# Patient Record
(demographics unavailable — no encounter records)

---

## 2024-10-14 NOTE — HISTORY OF PRESENT ILLNESS
[FreeTextEntry1] : Previously seen by Dr. Ángela Méndez in the past.  Workup was negative.  Pain at that time was presumed to be secondary to tamoxifen versus fibromyalgia. More recently, over the last few months, joint pain has gotten worse and is felt in left shoulder, left knee, b/l hands, wrist and elbows.  no joint swelling  left shoulder is stiff stiffness in the joints lasts all day  pain is described as sharp and burning +chronic shortness of breath due to asthma.  s/p spine surgery, L4-5 fusion tried Tylenol arthritis but it does not help  pain is so severe at times; she does not want to get up because it hurts.  1/2023 abs: RF negative, CRP negative, ESR wnl, CPK wnl, uric acid wnl, CCP negative

## 2024-10-14 NOTE — PHYSICAL EXAM
[General Appearance - Alert] : alert [General Appearance - In No Acute Distress] : in no acute distress [Full Pulse] : the pedal pulses are present [Edema] : there was no peripheral edema [FreeTextEntry1] : MCP and PIP TTP 2-5, wrist TTP b/l; decreased ROM left shoulder [Oriented To Time, Place, And Person] : oriented to person, place, and time [Impaired Insight] : insight and judgment were intact [Affect] : the affect was normal

## 2024-10-14 NOTE — ASSESSMENT
[FreeTextEntry1] : US of the hands, wrists, left shoulder and left knee to evaluate for presence of synovitis repeat serology as outlined below  Increase Celebrex to 200 mg bid for better symptom control.  US of the neck to evaluate supraclavicular fossa fullness. rule out LAD given history of breast CA  OV 1 months, sooner PRN

## 2024-11-25 NOTE — PHYSICAL EXAM
[de-identified] : Lumbar Physical Exam  Gait - Normal  Station - Normal  Sagittal balance - Normal  Compensatory mechanism? - None   Reflexes Patellar - normal Gastroc - normal Clonus - No  Hip Exam - Normal  Straight leg raise - none  Pulses - 2+ dp/pt  Range of motion - normal  Sensation  Sensation intact to light touch in L1, L2, L3, L4, L5 and S1 dermatomes bilaterally  Motor 	IP	Quad	HS	TA	Gastroc	EHL Right	5/5	5/5	5/5	5/5	5/5	5/5 Left	5/5	5/5	5/5	5/5	5/5	5/5 [de-identified] : Lumbar radiographs from last visit reviewed No instability on flexion-extension Hardware appears in appropriate position

## 2024-11-25 NOTE — ASSESSMENT
[FreeTextEntry1] : This is a 55-year-old female that is now approximately 7 months out from L4-L5 TLIF.  I had a lengthy discussion with the patient in regards to treatment plan and diagnosis. There are no red flag findings on imaging nor are there any red flag findings on clinical exams. Therefore we will proceed with a course of conservative treatment. This would include physical therapy/home exercise program, Celebrex, Tizanidine, and Tylenol as medically indicated. I have given her a prescription for lidocaine patches. The patient will follow up with me in approximately 4 weeks.  I encouraged the patient to follow-up sooner if there are any new or worsening symptoms.  We will get xrays at her next visit as well

## 2024-11-25 NOTE — HISTORY OF PRESENT ILLNESS
[de-identified] : CHECO MAGAÑA is a 55 year old female presenting to the office for a follow up evaluation s/p 7 months L4-L5 TLIF. DOS 04/23/2024 Patient reports symptomatic improvement from previous visit. Patient reports some right sided low back pain. she reports some stiffness in her back. She reports not being able to stand for more than 30 minutes due to this pain. Patient takes Celebrex and a muscle relaxer for pain relief.  09/25/2024 CHECO MAGAÑA is a 55 year old female presenting to the office for a follow up evaluation s/p 5 months L4-L5 TLIF.  Overall she notes improvement in her symptoms.  She does have some focal right sided low back pain.  She denies any issues in terms of bowel bladder function, she is walking and feels like stopping physical therapy helped her.  She denies any severe radiating pain.  Overall she is pleased with her recovery.  08.19.24 This is a 55-year-old female that is now approximately 4 months out from L4-L5 TLIF.  Overall she has done well.  She states that the sharp pain she had of her right buttock has improved.  She states that stopping physical therapy actually helped her symptoms.  She denies any severe radiating type pain.  Occasionally she does limp at this pain comes and goes.  Overall she is pleased with her recovery to date.

## 2024-11-25 NOTE — PHYSICAL EXAM
[de-identified] : Lumbar Physical Exam  Gait - Normal  Station - Normal  Sagittal balance - Normal  Compensatory mechanism? - None   Reflexes Patellar - normal Gastroc - normal Clonus - No  Hip Exam - Normal  Straight leg raise - none  Pulses - 2+ dp/pt  Range of motion - normal  Sensation  Sensation intact to light touch in L1, L2, L3, L4, L5 and S1 dermatomes bilaterally  Motor 	IP	Quad	HS	TA	Gastroc	EHL Right	5/5	5/5	5/5	5/5	5/5	5/5 Left	5/5	5/5	5/5	5/5	5/5	5/5 [de-identified] : Lumbar radiographs from last visit reviewed No instability on flexion-extension Hardware appears in appropriate position

## 2024-11-25 NOTE — ADDENDUM
[FreeTextEntry1] :  I, Jeanne Mendosa, acted solely as a scribe for Dr. Suman Pineda MD on this date 11/25/2024    All medical record entries made by the Scribe were at my, Dr. Suman Pineda MD., direction and personally dictated by me on 11/25/2024 . I have reviewed the chart and agree that the record accurately reflects my personal performance of the history, physical exam, assessment and plan. I have also personally directed, reviewed, and agreed with the chart.

## 2024-11-25 NOTE — HISTORY OF PRESENT ILLNESS
[de-identified] : CHECO MAGAÑA is a 55 year old female presenting to the office for a follow up evaluation s/p 7 months L4-L5 TLIF. DOS 04/23/2024 Patient reports symptomatic improvement from previous visit. Patient reports some right sided low back pain. she reports some stiffness in her back. She reports not being able to stand for more than 30 minutes due to this pain. Patient takes Celebrex and a muscle relaxer for pain relief.  09/25/2024 CHECO MAGAÑA is a 55 year old female presenting to the office for a follow up evaluation s/p 5 months L4-L5 TLIF.  Overall she notes improvement in her symptoms.  She does have some focal right sided low back pain.  She denies any issues in terms of bowel bladder function, she is walking and feels like stopping physical therapy helped her.  She denies any severe radiating pain.  Overall she is pleased with her recovery.  08.19.24 This is a 55-year-old female that is now approximately 4 months out from L4-L5 TLIF.  Overall she has done well.  She states that the sharp pain she had of her right buttock has improved.  She states that stopping physical therapy actually helped her symptoms.  She denies any severe radiating type pain.  Occasionally she does limp at this pain comes and goes.  Overall she is pleased with her recovery to date.

## 2024-11-27 NOTE — HISTORY OF PRESENT ILLNESS
[FreeTextEntry1] : Prior history  More recently, over the last few months, joint pain has gotten worse and is felt in left shoulder, left knee, b/l hands, wrist and elbows.  no joint swelling  left shoulder is stiff stiffness in the joints lasts all day  pain is described as sharp and burning +chronic shortness of breath due to asthma.  s/p spine surgery, L4-5 fusion tried Tylenol arthritis but it does not help  pain is so severe at times; she does not want to get up because it hurts.  1/2023 abs: RF negative, CRP negative, ESR wnl, CPK wnl, uric acid wnl, CCP negative     Pt presents for a follow up visit today.   Labs reviewed with patient in detail.  ESR wnl  CRP wnl  CMP wnl  RF negative CCP negative ADRIANNE, Sjogren's, RAPHAEL negative  US of the joints: Mild productive bone formation seen along the dorsal aspect of some of the visualized interphalangeal joints of the lesser fingers, which may be related to osteoarthritis. Small nonspecific joint effusion within the right second metacarpophalangeal joint without synovitis. No active synovitis seen within the visualized portions of the fingers. No synovitis or tenosynovitis seen within the visualized portions of the wrists bilaterally.  US neck: No concerning mass or fluid collection seen in the region of concern within supraclavicular fossa bilaterally. If there is further clinical concern for a soft tissue mass/lesion in these regions, consider further evaluation with chest MRI or CT with contrast.  US knee: No definite knee effusion. No evidence of synovitis.  US shoulder: No glenohumeral joint effusion. No active synovitis seen of the glenohumeral joint space. Productive bone formation along the acromioclavicular joint space, which may be related to osteoarthritis. No hyperemia seen.   She finds no improvement with meloxicam. She is taking dilaudid chronically for chronic LBP.  Onc appointment pending.

## 2024-11-27 NOTE — PHYSICAL EXAM
[General Appearance - Alert] : alert [General Appearance - In No Acute Distress] : in no acute distress [Full Pulse] : the pedal pulses are present [Edema] : there was no peripheral edema [Abnormal Walk] : normal gait [Nail Clubbing] : no clubbing  or cyanosis of the fingernails [Motor Tone] : muscle strength and tone were normal [Musculoskeletal - Swelling] : no joint swelling seen [Oriented To Time, Place, And Person] : oriented to person, place, and time [Impaired Insight] : insight and judgment were intact [Affect] : the affect was normal

## 2024-11-27 NOTE — ASSESSMENT
[FreeTextEntry1] : There is no evidence of underlying autoimmune disease at this time.   neck swelling--US without suspicious masses; she has an appointment planned with her oncologist and discuss this with them as well as she may need further imaging for breast CA f/u.   Osteoarthritis (OA) The patient presents with symptoms consistent with osteoarthritis, including joint pain and stiffness. Radiographic imaging and clinical examination support the diagnosis. I educated patient on this condition and management strategies.  Pain management:  -Trial of alternative NSAID,  Nabumetone. Potential side effects discussed including GI related side effects, renal/hepatoxicity.  Physical therapy     - Referred to hand therapy for strengthening exercises and joint protection techniques.  OV 3 months sooner PRN   My collective time spent on today's visit was greater than 30 minutes and included: Preparation for the visit, review of the medical records, review of pertinent diagnostic studies, examination and counseling of the patient on the above diagnosis, treatment plan and prognosis, orders of diagnostic test, medications and or appropriate procedures and documentation in the medical record of today's visit.

## 2025-02-06 NOTE — END OF VISIT
[FreeTextEntry3] : My collective time spent on today's visit was greater than 30 minutes and included: Preparation for the visit, review of the medical records, review of pertinent diagnostic studies, examination and counseling of the patient on the above diagnosis, treatment plan and prognosis, orders of diagnostic test, medications and or appropriate procedures and documentation in the medical record of today's visit. [Time Spent: ___ minutes] : I have spent [unfilled] minutes of time on the encounter which excludes teaching and separately reported services.

## 2025-02-06 NOTE — PHYSICAL EXAM
[Alert] : alert [No Acute Distress] : no acute distress [Sclera] : the sclera and conjunctiva were normal [Hearing Threshold Finger Rub Not [place]] : hearing was normal [Normal Appearance] : the appearance of the neck was normal [No Respiratory Distress] : no respiratory distress [Heart Rate And Rhythm] : heart rate was normal and rhythm regular [Auscultation Breath Sounds / Voice Sounds] : lungs were clear to auscultation bilaterally [Normal S1, S2] : normal S1 and S2 [Bowel Sounds] : normal bowel sounds [No Masses] : no abdominal mass palpated [Abdomen Soft] : soft [Supraclavicular Lymph Nodes Enlarged Bilaterally] : no supraclavicular lymphadenopathy [No CVA Tenderness] : no CVA  tenderness [Normal Color / Pigmentation] : normal skin color and pigmentation [Abnormal Walk] : normal gait [Cranial Nerves Intact] : cranial nerves 2-12 were intact [No Focal Deficits] : no focal deficits [Oriented To Time, Place, And Person] : oriented to person, place, and time [de-identified] : upper abdominal tenderness [de-identified] : depressed

## 2025-02-06 NOTE — HISTORY OF PRESENT ILLNESS
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

## 2025-02-06 NOTE — HISTORY OF PRESENT ILLNESS
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

## 2025-02-06 NOTE — REVIEW OF SYSTEMS
[Shortness Of Breath] : shortness of breath [Abdominal Pain] : abdominal pain [Heartburn] : heartburn [Arthralgias (joint pain)] : arthralgias [Joint Stiffness] : joint stiffness [Negative] : Heme/Lymph [As Noted in HPI] : as noted in HPI [FreeTextEntry9] : inflamed joints

## 2025-02-06 NOTE — PHYSICAL EXAM
[Alert] : alert [No Acute Distress] : no acute distress [Sclera] : the sclera and conjunctiva were normal [Hearing Threshold Finger Rub Not [employer]] : hearing was normal [Normal Appearance] : the appearance of the neck was normal [No Respiratory Distress] : no respiratory distress [Auscultation Breath Sounds / Voice Sounds] : lungs were clear to auscultation bilaterally [Heart Rate And Rhythm] : heart rate was normal and rhythm regular [Normal S1, S2] : normal S1 and S2 [Bowel Sounds] : normal bowel sounds [No Masses] : no abdominal mass palpated [Abdomen Soft] : soft [Supraclavicular Lymph Nodes Enlarged Bilaterally] : no supraclavicular lymphadenopathy [No CVA Tenderness] : no CVA  tenderness [Abnormal Walk] : normal gait [Normal Color / Pigmentation] : normal skin color and pigmentation [Cranial Nerves Intact] : cranial nerves 2-12 were intact [No Focal Deficits] : no focal deficits [Oriented To Time, Place, And Person] : oriented to person, place, and time [de-identified] : upper abdominal tenderness [de-identified] : depressed

## 2025-02-06 NOTE — ASSESSMENT
[FreeTextEntry1] : 1.  Abdominal pain, fever, n/v.  CT A/P in 1/2025 with 2.7 cm fatty replacement of pancreas and ?focal pancreatitis/duodenitis. 2.  Status post cholecystectomy for hyperdynamic gallbladder.  CT A/P, MRI, EUS in 2023 with fatty replacement of pancreas.  May have functional GI disorder, gastritis, gas pain/ bloating, vascular etiology, recurrent pancreatitis. 3.  Family history of colon cancer (mother- 60).  Prior colonoscopy in 2022. 4.  History of breast cancer.  Recs: - ER labs and CT images reviewed. - Check IgG4 level. - Pantoprazole renewed. - Ondansetron PRN nausea. - Will review imaging of pancreas with advanced attending to see if MRI or repeat EUS is warranted.

## 2025-02-06 NOTE — PHYSICAL EXAM
[Alert] : alert [No Acute Distress] : no acute distress [Sclera] : the sclera and conjunctiva were normal [Hearing Threshold Finger Rub Not [place]] : hearing was normal [Normal Appearance] : the appearance of the neck was normal [No Respiratory Distress] : no respiratory distress [Heart Rate And Rhythm] : heart rate was normal and rhythm regular [Auscultation Breath Sounds / Voice Sounds] : lungs were clear to auscultation bilaterally [Normal S1, S2] : normal S1 and S2 [Bowel Sounds] : normal bowel sounds [No Masses] : no abdominal mass palpated [Abdomen Soft] : soft [Supraclavicular Lymph Nodes Enlarged Bilaterally] : no supraclavicular lymphadenopathy [No CVA Tenderness] : no CVA  tenderness [Normal Color / Pigmentation] : normal skin color and pigmentation [Abnormal Walk] : normal gait [Cranial Nerves Intact] : cranial nerves 2-12 were intact [No Focal Deficits] : no focal deficits [Oriented To Time, Place, And Person] : oriented to person, place, and time [de-identified] : upper abdominal tenderness [de-identified] : depressed

## 2025-02-06 NOTE — HISTORY OF PRESENT ILLNESS
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

## 2025-02-19 NOTE — ASSESSMENT
[FreeTextEntry1] : This is a 56-year-old female that is now approximately 10 months out from surgery. There are no red flag findings on imaging nor are there any red flag findings on clinical exams.  Therefore we will proceed with a course of conservative treatment. This would include physical therapy/home exercise program, Tylenol, Tizanidine, Celebrex as medically indicated.  The patient will follow up with me in approximately 2 months.  I encouraged the patient to follow-up sooner if there are any new or worsening symptoms.

## 2025-02-19 NOTE — ADDENDUM
[FreeTextEntry1] :  I, Jeanne Mendosa, acted solely as a scribe for Dr. Suman Pineda MD on this date 02/19/2025    All medical record entries made by the Scribe were at my, Dr. Suman Pineda MD., direction and personally dictated by me on 02/19/2025 . I have reviewed the chart and agree that the record accurately reflects my personal performance of the history, physical exam, assessment and plan. I have also personally directed, reviewed, and agreed with the chart.

## 2025-02-19 NOTE — PHYSICAL EXAM
[de-identified] : Lumbar Physical Exam  walking improved  Gait - Normal  Station - Normal  Sagittal balance - Normal  Compensatory mechanism? - None   Reflexes Patellar - normal Gastroc - normal Clonus - No  Hip Exam - Normal  Straight leg raise - none  Pulses - 2+ dp/pt  Range of motion - normal  Sensation  Sensation intact to light touch in L1, L2, L3, L4, L5 and S1 dermatomes bilaterally  Motor 	IP	Quad	HS	TA	Gastroc	EHL Right	5/5	5/5	5/5	5/5	5/5	5/5 Left	5/5	5/5	5/5	5/5	5/5	5/5 [de-identified] :  previous imaging: Lumbar radiographs from last visit reviewed No instability on flexion-extension Hardware appears in appropriate position No

## 2025-02-19 NOTE — HISTORY OF PRESENT ILLNESS
[de-identified] : CHECO MAGAÑA is a 56 year old female presenting to the office for a follow up evaluation s/p 10 months L4-L5 TLIF. DOS 04/23/2024. She reports that she is able to walk for about 30 minutes before she has back pain. Patient states that after walking for an extended period, she feels weakness in her right LE. She has left ankle pain. She has a scheduled appointment with her rheumatologist.   11.25.24 CHECO MAGAÑA is a 55 year old female presenting to the office for a follow up evaluation s/p 7 months L4-L5 TLIF. DOS 04/23/2024 Patient reports symptomatic improvement from previous visit. Patient reports some right sided low back pain. she reports some stiffness in her back. She reports not being able to stand for more than 30 minutes due to this pain. Patient takes Celebrex and a muscle relaxer for pain relief.  09/25/2024 CHECO MAGAÑA is a 55 year old female presenting to the office for a follow up evaluation s/p 5 months L4-L5 TLIF.  Overall she notes improvement in her symptoms.  She does have some focal right sided low back pain.  She denies any issues in terms of bowel bladder function, she is walking and feels like stopping physical therapy helped her.  She denies any severe radiating pain.  Overall she is pleased with her recovery.  08.19.24 This is a 55-year-old female that is now approximately 4 months out from L4-L5 TLIF.  Overall she has done well.  She states that the sharp pain she had of her right buttock has improved.  She states that stopping physical therapy actually helped her symptoms.  She denies any severe radiating type pain.  Occasionally she does limp at this pain comes and goes.  Overall she is pleased with her recovery to date.

## 2025-02-20 NOTE — HISTORY OF PRESENT ILLNESS
[FreeTextEntry1] :     Pt presents for a follow up visit today.  Most recent labs/tests as follows:   ESR wnl  CRP wnl  CMP wnl  RF negative CCP negative ADRIANNE, Sjogren's, RAPHAEL negative  US of the joints: Mild productive bone formation seen along the dorsal aspect of some of the visualized interphalangeal joints of the lesser fingers, which may be related to osteoarthritis. Small nonspecific joint effusion within the right second metacarpophalangeal joint without synovitis. No active synovitis seen within the visualized portions of the fingers. No synovitis or tenosynovitis seen within the visualized portions of the wrists bilaterally.  US neck: No concerning mass or fluid collection seen in the region of concern within supraclavicular fossa bilaterally. If there is further clinical concern for a soft tissue mass/lesion in these regions, consider further evaluation with chest MRI or CT with contrast.  US knee: No definite knee effusion. No evidence of synovitis.  US shoulder: No glenohumeral joint effusion. No active synovitis seen of the glenohumeral joint space. Productive bone formation along the acromioclavicular joint space, which may be related to osteoarthritis. No hyperemia seen.   She is taking dilaudid chronically for chronic LBP.   Patient notes that nabumetone did not provide relief for more than 2 hours after taking the dose.  She finds Celebrex more helpful.   Pain is severe and diffuse.  She finds that the pain is worse with activity.  Patient does not find occupational therapy helpful.  She finds that the pain has gotten worse over the last few months.   Can not participate in PT as BP increases due to increased pain levels during the session  Sleeps poorly due to pain  taking gabapentin 900 mg at night

## 2025-02-20 NOTE — REVIEW OF SYSTEMS
[facility] at [facility]
[FreeTextEntry1] : as per HPI, otherwise negative
[FreeTextEntry1] : as per HPI, otherwise negative

## 2025-02-20 NOTE — ASSESSMENT
[FreeTextEntry1] : Patient with chronic musculoskeletal pain. Pain is non-inflammatory and work up for inflammatory arthritis has been negative thus far.  Response to NSAIDs is limited.  Use of duloxetine discussed.  R/B/A discussed, and patient is agreeable to proceed.  Patient instructed to monitor BP at home and if notes any increases to let me know of any increases.   OV 1 month, sooner PRN   My collective time spent on today's visit was greater than 30 minutes and included: Preparation for the visit, review of the medical records, review of pertinent diagnostic studies, examination and counseling of the patient on the above diagnosis, treatment plan and prognosis, orders of diagnostic test, medications and or appropriate procedures and documentation in the medical record of today's visit.

## 2025-07-09 NOTE — HISTORY OF PRESENT ILLNESS
[de-identified] : CHECO MAGAÑA is a 56 year old female presenting to the office for a follow up evaluation s/p 15 months L4-L5 TLIF. DOS 04/23/2024. She was recently admitted to the hospital due to an asthma attack. She currently has knee pain and some flank pain. Overall, she is able to walk better compared to her last visit. 02.19.2025 CHECO MAGAÑA is a 56 year old female presenting to the office for a follow up evaluation s/p 10 months L4-L5 TLIF. DOS 04/23/2024. She reports that she is able to walk for about 30 minutes before she has back pain. Patient states that after walking for an extended period, she feels weakness in her right LE. She has left ankle pain. She has a scheduled appointment with her rheumatologist.  11.25.24 CHECO MAGAÑA is a 55 year old female presenting to the office for a follow up evaluation s/p 7 months L4-L5 TLIF. DOS 04/23/2024 Patient reports symptomatic improvement from previous visit. Patient reports some right sided low back pain. she reports some stiffness in her back. She reports not being able to stand for more than 30 minutes due to this pain. Patient takes Celebrex and a muscle relaxer for pain relief.  09/25/2024 CHECO MAGAÑA is a 55 year old female presenting to the office for a follow up evaluation s/p 5 months L4-L5 TLIF. Overall she notes improvement in her symptoms. She does have some focal right sided low back pain. She denies any issues in terms of bowel bladder function, she is walking and feels like stopping physical therapy helped her. She denies any severe radiating pain. Overall she is pleased with her recovery.  08.19.24 This is a 55-year-old female that is now approximately 4 months out from L4-L5 TLIF. Overall she has done well. She states that the sharp pain she had of her right buttock has improved. She states that stopping physical therapy actually helped her symptoms. She denies any severe radiating type pain. Occasionally she does limp at this pain comes and goes. Overall she is pleased with her recovery to date.

## 2025-07-09 NOTE — ADDENDUM
Assessment & Plan     Visit for screening mammogram    - MA SCREENING DIGITAL BILAT - Future  (s+30); Future    Encounter for long-term (current) use of medications    - [id number] - Urine Drug Confirmation Panel (Comprehensive); Future  - [id number] - Urine Drug Confirmation Panel (Comprehensive)    Chronic pain of left wrist  Signed pain contract and will leave urine drug screen. Increased gabapentin to 300mg TID. Labs today. Pain medication continues to keep her working and functional   - [id number] - Urine Drug Confirmation Panel (Comprehensive); Future  - gabapentin (NEURONTIN) 300 MG capsule; Take 1 capsule (300 mg) by mouth 3 times daily  - CBC with Platelets & Differential; Future  - Basic metabolic panel; Future  - [id number] - Urine Drug Confirmation Panel (Comprehensive)  - CBC with Platelets & Differential  - Basic metabolic panel    Post-traumatic osteoarthritis of left wrist  - gabapentin (NEURONTIN) 300 MG capsule; Take 1 capsule (300 mg) by mouth 3 times daily    Gastric ulcer without hemorrhage or perforation, unspecified chronicity  Well controlled.   - famotidine (PEPCID) 40 MG tablet; TAKE 1 TABLET BY MOUTH EVERYDAY AT BEDTIME    Chronic non-seasonal allergic rhinitis  Stable, discussed long term use. Blood pressure normal today.   - pseudoePHEDrine (SUDAFED) 120 MG 12 hr tablet; Take 1 tablet (120 mg) by mouth every 12 hours    Traumatic arthritis of left wrist  - traMADol (ULTRAM) 50 MG tablet; Take 1 tablet (50 mg) by mouth every 6 hours as needed for severe pain                 Return for Pain Check.    [facility] ROBIBE William is a 61 year old who presents for the following health issues     Pain    History of Present Illness     Reason for visit:  Medication updateSelliott consumes 3 sweetened beverage(s) daily.She exercises with enough effort to increase her heart rate 60 or more minutes per day.  She exercises with enough effort to increase her heart  [FreeTextEntry1] :  I, Kartik Elam, acted solely as a scribe for Dr. Suman Pineda MD on this date 07/09/2025   All medical record entries made by the Scribe were at my, Dr. Suman Pineda MD., direction and personally dictated by me on 07/09/2025 . I have reviewed the chart and agree that the record accurately reflects my personal performance of the history, physical exam, assessment and plan. I have also personally directed, reviewed, and agreed with the chart.   rate 5 days per week.   She is taking medications regularly.       Depression Followup    How are you doing with your depression since your last visit? Worsened     Are you having other symptoms that might be associated with depression? Yes:  pt stated anxiety     Have you had a significant life event?  Grief or Loss Father passed    Are you feeling anxious or having panic attacks?   Yes:  general anxiety per pt    Do you have any concerns with your use of alcohol or other drugs? No    Social History     Tobacco Use     Smoking status: Never Smoker     Smokeless tobacco: Never Used   Substance Use Topics     Alcohol use: No     Drug use: No     PHQ 5/4/2021 1/28/2022 3/1/2022   PHQ-9 Total Score 3 9 11   Q9: Thoughts of better off dead/self-harm past 2 weeks Not at all Not at all Not at all     CAROLYN-7 SCORE 8/3/2016 1/28/2022 3/1/2022   Total Score - 13 (moderate anxiety) -   Total Score 17 13 14     Last PHQ-9 3/1/2022   1.  Little interest or pleasure in doing things 1   2.  Feeling down, depressed, or hopeless 1   3.  Trouble falling or staying asleep, or sleeping too much 1   4.  Feeling tired or having little energy 2   5.  Poor appetite or overeating 2   6.  Feeling bad about yourself 1   7.  Trouble concentrating 2   8.  Moving slowly or restless 1   Q9: Thoughts of better off dead/self-harm past 2 weeks 0   PHQ-9 Total Score 11   Difficulty at work, home, or with people Somewhat difficult     CAROLYN-7  3/1/2022   1. Feeling nervous, anxious, or on edge 2   2. Not being able to stop or control worrying 2   3. Worrying too much about different things 2   4. Trouble relaxing 2   5. Being so restless that it is hard to sit still 2   6. Becoming easily annoyed or irritable 2   7. Feeling afraid, as if something awful might happen 2   CAROLYN-7 Total Score 14   If you checked any problems, how difficult have they made it for you to do your work, take care of things at home, or get along with other people? Somewhat  difficult       Pain History:  When did you first notice your pain? - More than 6 weeks   Have you seen this provider for your pain in the past?   Yes   Where in your body do you have pain? Picking and packing  Are you seeing anyone else for your pain? No    PHQ-9 SCORE 2021 2022 3/1/2022   PHQ-9 Total Score - - -   PHQ-9 Total Score MyChart - 9 (Mild depression) -   PHQ-9 Total Score 3 9 11       CAROLYN-7 SCORE 8/3/2016 2022 3/1/2022   Total Score - 13 (moderate anxiety) -   Total Score 17 13 14           PHQ-9 SCORE 2021 2022 3/1/2022   PHQ-9 Total Score - - -   PHQ-9 Total Score MyChart - 9 (Mild depression) -   PHQ-9 Total Score 3 9 11     CAROLYN-7 SCORE 8/3/2016 2022 3/1/2022   Total Score - 13 (moderate anxiety) -   Total Score 17 13 14     PEG Score 2022 3/1/2022   PEG Total Score 3.67 5       Chronic Pain Follow Up:    Location of pain: left wrist pain  Analgesia/pain control:    - Recent changes:  none   - Overall control: Comfortably manageable    - Current treatments: Tramadol and gabapentin.  Diclofenac gel.   Adherence:     - Do you ever take more pain medicine than prescribed? No    - When did you take your last dose of pain medicine?  This morning.    Adverse effects: No   PDMP Review       Value Time User    State PDMP site checked  Yes 3/1/2022 11:34 AM Meg Bernardo, MAC        Last CSA Agreement:   CSA -- Patient Level:    Controlled Substance Agreement - Opioid - Scan on 2021  7:50 AM  Controlled Substance Agreement - Opioid - Scan on 7/15/2019  1:42 PM       Last UDS: 1/15/2021      Dad . Has been really anxious. Family disagreements happening. Does not want counseling. Sleeping good off and on.     Increasing the gabapentin to BID has helped. No fatigue.   Still taking Tramadol 4 times per day. Last dose this am.     Famotidine- helps. Takes every night.     Has tried everything. Sudafed has been the only thing that works for her. Constantly blowing her  "nose.   When at work and things are on the top shelf causes the sharp shooting pains int he wrist.      Review of Systems   Constitutional, HEENT, cardiovascular, pulmonary, gi and gu systems are negative, except as otherwise noted.      Objective    /63 (BP Location: Right arm, Patient Position: Chair, Cuff Size: Adult Regular)   Pulse 95   Temp 97.9  F (36.6  C) (Oral)   Ht 1.607 m (5' 3.25\")   Wt 50.4 kg (111 lb 3.2 oz)   SpO2 98%   Breastfeeding No   BMI 19.54 kg/m    Body mass index is 19.54 kg/m .  Physical Exam   GENERAL: healthy, alert and no distress  RESP: lungs clear to auscultation - no rales, rhonchi or wheezes  CV: regular rate and rhythm, normal S1 S2, no S3 or S4, no murmur,   MS: no gross musculoskeletal defects noted, no edema- full range of motion of the left wrist. Pain with palpation throughout the left wrist. Normal  strength.   PSYCH: mentation appears normal, affect flat                "

## 2025-07-09 NOTE — ASSESSMENT
[FreeTextEntry1] : Given patient's history of spinal fusion over the past year, I want to get a CT scan to evaluate her hardware to ensure appropriate healing and proper positioning of her hardware.  She has had an up-and-down course in terms of recovery.  Also she has recently become bacteremic from pyelonephritis.  I would like to ensure she is properly healing with a CT scan.  She should continue with her activities a day living including walking.  I will see her back in 4 weeks.  All questions were answered.

## 2025-07-09 NOTE — PHYSICAL EXAM
[de-identified] : Lumbar Physical Exam  walking improved  Gait - Normal  Station - Normal  Sagittal balance - Normal  Compensatory mechanism? - None   Reflexes Patellar - normal Gastroc - normal Clonus - No  Hip Exam - Normal  Straight leg raise - none  Pulses - 2+ dp/pt  Range of motion - normal  Sensation Sensation intact to light touch in L1, L2, L3, L4, L5 and S1 dermatomes bilaterally  Motor  IP Quad HS TA Gastroc EHL Right 5/5 5/5 5/5 5/5 5/5 5/5 Left 5/5 5/5 5/5 5/5 5/5 5/5 [de-identified] : Lumbar radiographs:  No instability on flexion-extension Hardware appears in appropriate position.   previous imaging: Lumbar radiographs from last visit reviewed No instability on flexion-extension Hardware appears in appropriate position.

## 2025-07-10 NOTE — PHYSICAL EXAM
[Fully active, able to carry on all pre-disease performance without restriction] : Status 0 - Fully active, able to carry on all pre-disease performance without restriction [Normal] : affect appropriate [de-identified] : left breast well healed scar

## 2025-07-10 NOTE — ASSESSMENT
[Curative] : Goals of care discussed with patient: Curative [FreeTextEntry1] : In summary, Ms. CHECO MAGAÑA is a 53 year old postmenopausal female with stage IIA (T2, N0, M0) ER positive, AR positive, HER-2/colleen negative invasive ductal carcinoma ( with lobular features) of the left breast dx 6/2014. She is status post lumpectomy in 8/2014.Oncotype 9 She took Tamoxifen 9/2014 and competed RT 11/2014. BSO 7/28/15. She was switched to AI therapy s/p BSO, exemestane 1/20/2016. She has severe arthralgias and fatigue due to AI. Stopped after 3 months and is been on tamoxifen since. First visit with me 3/2022   7/7/2025: WILDE COMPLETED 9/2024 SHE IS DOING WELL  Up to date with GYN Last Breast Imaging: Bilateral Diagnostic Mammogram and Bilateral Breast Ultrasound completed on July 2, 2025; BI-RADS 2.  The patient was hospitalized last month due to an asthma exacerbation complicated by viral pneumonia which she was treated with Ertapenem out patient. During her hospitalization, she also developed an E. coli infection that impacted her renal function. She continues to experience asthma attacks and has been prescribed Advair for maintenance therapy. Additionally, she is currently taking levocetirizine to help manage her symptoms. Signs and sx of recurrence discussed with the patient.  Lifestyle changes reviewed.  Age-appropriate cancer screening d/e her.  Follow-up in survivorship

## 2025-07-10 NOTE — HISTORY OF PRESENT ILLNESS
[T: ___] : T[unfilled] [N: ___] : N[unfilled] [M: ___] : M[unfilled] [AJCC Stage: ____] : AJCC Stage: [unfilled] [de-identified] : Ms. Tunde Avila is a 53 year old female here for an evaluation of breast cancer. Her oncologic history is as follows: carly speaking  She felt a lump in her left breast in 4/2014 BL diagnostic mammo 6/2014( BIRADS 5) showed 2 suspicions abnormalities in the left breast: a 1.8 cm nodule at 2 o'clock and a periareolar 0.9 cm nodule at 4:00. She was dx with stage IIA (pT2N0) ER/DE+ HER2 negative invasive ductal carcinoma of the left breast with lobular features measuring 2.5 cm , margins were negative 0/1 SLN negative... An intraductal papilloma and columnar cell hyperplasia with atypia was noted  at 4 oclock.  She underwent a left breast lumpectomy and SLNB in 8/2014.Oncotype 9  She took Tamoxifen 9/2014 and competed RT 11/2014   During her GYN f.u she was noted to have an elevated Ca125 and underwent a pelvic son on which revealed BL complex cysts. She was seen by Dr. Parikh and underwent a laparoscopic hysterectomy and BSO 7/28/15. She was switched to AI therapy s/p BSO, exemestane 1/20/2016. She has severe arthralgias and fatigue due to AI. Stopped after 3 months and is been on tamoxifen since  She feels weakness and gen pain in her bones. 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 genetics neg    Last seen 11/2022.  She developed difficulty eating and abd pain 8/2023. CT A/P 8/2023 reviewed: showed pancreatic lesion. MRI 9/2023 NO LESION. EGD/colonoscopy V[facility]IAN Saw surgery. She is scheduled for cholecystectomy in 2 weeks, rec to hold tamoxifen a week before and aafter. Risk of DVT/PE d/w the pt. She will start baby ASA after surgery.     4/2024 She is tolerating tamoxifen well with tolerable hot flashes. Good compliance. She denies mood changes, wt gain, vaginal dryness, SOB, chest pain, leg swelling or clotting issues. Her energy and appetite is good, wt stable.  She has chronic gen arthralgias, bone scan VIVIAN 4/2022. Rheum markers negative. F/b pain management. s/p epidural. No worsening of chronic pain. She had MRI L spine 2/2024 and 4/2024 CT spine - arthritic changes. No mets. Planned for back surgery next week. Rec to hold tamoxifen. Risk of DVT d/w her. She will restart tamoxifen when she is ambulating w/o issues. Continue asa with dow[facility]  GYN - not seen, she had hysterectomy. Reminded to see gyn Breast imaging: 3/2023 BIRADS  2, 4/2024 birads 2  Opthal: 6/2021. Reminded annual f/u She will complete 10 yrs of downey 9/2024. rto 1 yr  [de-identified] :  Ms. CHECO MAGAÑA is a 56-year-old postmenopausal female with stage IIA (T2, N0, M0) ER positive, MA positive, HER-2/colleen negative invasive ductal carcinoma ( with lobular features) of the left breast dx 6/2014. She is status post lumpectomy in 8/2014.Oncotype 9. She took Tamoxifen 9/2014 and competed RT 11/2014 . BSO 7/28/15. She was switched to AI therapy s/p BSO, exemestane 1/20/2016. She has severe arthralgias and fatigue due to AI. Stopped after 3 months and is been on tamoxifen since. Jud speaking   7/7/2025: WILDE COMPLETED 9/2024 SHE IS DOING WELL  Up to date with GYN Last Breast Imaging: Bilateral Diagnostic Mammogram and Bilateral Breast Ultrasound completed on July 2, 2025; BI-RADS 2.  The patient was hospitalized last month due to an asthma exacerbation complicated by viral pneumonia which she was treated with Ertapenem out patient. During her hospitalization, she also developed an E. coli infection that impacted her renal function. She continues to experience asthma attacks and has been prescribed Advair for maintenance therapy. Additionally, she is currently taking levocetirizine to help manage her symptoms. Signs and sx of recurrence discussed with the patient.  Lifestyle changes reviewed.  Age-appropriate cancer screening d/e her.  Follow-up in survivorship

## 2025-07-10 NOTE — ASSESSMENT
[Curative] : Goals of care discussed with patient: Curative [FreeTextEntry1] : In summary, Ms. CHECO MAGAÑA is a 53 year old postmenopausal female with stage IIA (T2, N0, M0) ER positive, ME positive, HER-2/colleen negative invasive ductal carcinoma ( with lobular features) of the left breast dx 6/2014. She is status post lumpectomy in 8/2014.Oncotype 9 She took Tamoxifen 9/2014 and competed RT 11/2014. BSO 7/28/15. She was switched to AI therapy s/p BSO, exemestane 1/20/2016. She has severe arthralgias and fatigue due to AI. Stopped after 3 months and is been on tamoxifen since. First visit with me 3/2022   7/7/2025: WILDE COMPLETED 9/2024 SHE IS DOING WELL  Up to date with GYN Last Breast Imaging: Bilateral Diagnostic Mammogram and Bilateral Breast Ultrasound completed on July 2, 2025; BI-RADS 2.  The patient was hospitalized last month due to an asthma exacerbation complicated by viral pneumonia which she was treated with Ertapenem out patient. During her hospitalization, she also developed an E. coli infection that impacted her renal function. She continues to experience asthma attacks and has been prescribed Advair for maintenance therapy. Additionally, she is currently taking levocetirizine to help manage her symptoms. Signs and sx of recurrence discussed with the patient.  Lifestyle changes reviewed.  Age-appropriate cancer screening d/e her.  Follow-up in survivorship

## 2025-07-10 NOTE — REASON FOR VISIT
[Follow-Up Visit] : a follow-up [Other: _____] : [unfilled] [FreeTextEntry2] : invasive ductal carcinoma of the left breast

## 2025-07-10 NOTE — HISTORY OF PRESENT ILLNESS
[T: ___] : T[unfilled] [N: ___] : N[unfilled] [M: ___] : M[unfilled] [AJCC Stage: ____] : AJCC Stage: [unfilled] [de-identified] : Ms. Tunde Avila is a 53 year old female here for an evaluation of breast cancer. Her oncologic history is as follows: carly speaking  She felt a lump in her left breast in 4/2014 BL diagnostic mammo 6/2014( BIRADS 5) showed 2 suspicions abnormalities in the left breast: a 1.8 cm nodule at 2 o'clock and a periareolar 0.9 cm nodule at 4:00. She was dx with stage IIA (pT2N0) ER/DC+ HER2 negative invasive ductal carcinoma of the left breast with lobular features measuring 2.5 cm , margins were negative 0/1 SLN negative... An intraductal papilloma and columnar cell hyperplasia with atypia was noted  at 4 oclock.  She underwent a left breast lumpectomy and SLNB in 8/2014.Oncotype 9  She took Tamoxifen 9/2014 and competed RT 11/2014   During her GYN f.u she was noted to have an elevated Ca125 and underwent a pelvic son on which revealed BL complex cysts. She was seen by Dr. Parikh and underwent a laparoscopic hysterectomy and BSO 7/28/15. She was switched to AI therapy s/p BSO, exemestane 1/20/2016. She has severe arthralgias and fatigue due to AI. Stopped after 3 months and is been on tamoxifen since  She feels weakness and gen pain in her bones. 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 genetics neg    Last seen 11/2022.  She developed difficulty eating and abd pain 8/2023. CT A/P 8/2023 reviewed: showed pancreatic lesion. MRI 9/2023 NO LESION. EGD/colonoscopy [facility] Saw surgery. She is scheduled for cholecystectomy in 2 weeks, rec to hold tamoxifen a week before and aafter. Risk of DVT/PE d/w the pt. She will start baby ASA after surgery.     4/2024 She is tolerating tamoxifen well with tolerable hot flashes. Good compliance. She denies mood changes, wt gain, vaginal dryness, SOB, chest pain, leg swelling or clotting issues. Her energy and appetite is good, wt stable.  She has chronic gen arthralgias, bone scan [facility] 4/2022. Rheum markers negative. F/b pain management. s/p epidural. No worsening of chronic pain. She had MRI L spine 2/2024 and 4/2024 CT spine - arthritic changes. No mets. Planned for back surgery next week. Rec to hold tamoxifen. Risk of DVT d/w her. She will restart tamoxifen when she is ambulating w/o issues. Continue asa with [facility]  GYN - not seen, she had hysterectomy. Reminded to see gyn Breast imaging: 3/2023 BIRADS  2, 4/2024 birads 2  Opthal: 6/2021. Reminded annual f/u She will complete 10 yrs of downey 9/2024. rto 1 yr  [de-identified] :  Ms. CHECO MAGAÑA is a 56-year-old postmenopausal female with stage IIA (T2, N0, M0) ER positive, AL positive, HER-2/colleen negative invasive ductal carcinoma ( with lobular features) of the left breast dx 6/2014. She is status post lumpectomy in 8/2014.Oncotype 9. She took Tamoxifen 9/2014 and competed RT 11/2014 . BSO 7/28/15. She was switched to AI therapy s/p BSO, exemestane 1/20/2016. She has severe arthralgias and fatigue due to AI. Stopped after 3 months and is been on tamoxifen since. Jud speaking   7/7/2025: WILDE COMPLETED 9/2024 SHE IS DOING WELL  Up to date with GYN Last Breast Imaging: Bilateral Diagnostic Mammogram and Bilateral Breast Ultrasound completed on July 2, 2025; BI-RADS 2.  The patient was hospitalized last month due to an asthma exacerbation complicated by viral pneumonia which she was treated with Ertapenem out patient. During her hospitalization, she also developed an E. coli infection that impacted her renal function. She continues to experience asthma attacks and has been prescribed Advair for maintenance therapy. Additionally, she is currently taking levocetirizine to help manage her symptoms. Signs and sx of recurrence discussed with the patient.  Lifestyle changes reviewed.  Age-appropriate cancer screening d/e her.  Follow-up in survivorship

## 2025-07-10 NOTE — PHYSICAL EXAM
[Fully active, able to carry on all pre-disease performance without restriction] : Status 0 - Fully active, able to carry on all pre-disease performance without restriction [Normal] : affect appropriate [de-identified] : left breast well healed scar

## 2025-07-13 NOTE — PHYSICAL EXAM
[de-identified] : Constitutional:  [    ], alert and oriented, cooperative, in no acute distress.  HEENT  NC/AT.  Appearance: symmetric  Neck/Back Straight without deformity or instability.  Good ROM.  Chest/Respiratory  Respiratory effort: no intercostal retractions or use of accessory muscles. Nonlabored Breathing  Skin  On inspection, warm and dry without rashes or lesions.  Mental Status:  Judgment, insight: intact Orientation: oriented to time, place, and person  Neurological: Sensory and Motor are grossly intact throughout  Left Knee  Inspection:     Skin intact, no rashes or lesions     No Effusion     Non-tender to palpation over tibial tubercle, patella, medial and lateral joint line, and pes insertion.  Range of Motion: 	Extension - [     ] degrees 	Flexion - [     ] degrees 	Alignment - [     ] degrees 	Extensor lag: None  Stability:      Demonstrates no Varus or Valgus instability      Negative Anterior or Posterior drawer.      Negative Lachman's      Negative McMurrays  Patella: stable, tracks well.   Neurologic Exam     Motor intact including 5/5 Extensor Hallucis Longus, 5/5 Flexor Hallucis Longus, 5/5 Tibialis Anterior and 5/5 Gastrocnemius     Sensation Intact to Light Touch including Saphenous, Sural, Superficial Peroneal, Deep Peroneal, Tibial nerve distributions  Vascular Exam     Foot is warm and well perfused with 2+ Dorsalis Pedis Pulse   No pain with range of motion of the bilateral hips or right knee. No lumbar paraspinal muscle tenderness.

## 2025-07-22 NOTE — DISCUSSION/SUMMARY
[de-identified] : Tunde Avila is a 56-year-old female who presents to the office for evaluation of her left knee pain.  X-rays showed mild left knee osteoarthritis.  Examination showed good left knee range of motion. Discussed with the patient the examination and imaging findings.  Discussed with the patient the management of patient's knee osteoarthritis at this time, including physical therapy, anti-inflammatories, and injections.  Patient was given referral for physical therapy.  Patient will take Tylenol as needed for pain control.  Patient will follow-up in 3 months for reevaluation and management.  Patient understanding and in agreement the plan.  All questions answered.  Plan: -Physical Therapy -Tylenol as needed for pain control -Follow up in 3 months for reevaluation and management

## 2025-07-22 NOTE — DISCUSSION/SUMMARY
[de-identified] : Tunde Avila is a 56-year-old female who presents to the office for evaluation of her left knee pain.  X-rays showed mild left knee osteoarthritis.  Examination showed good left knee range of motion. Discussed with the patient the examination and imaging findings.  Discussed with the patient the management of patient's knee osteoarthritis at this time, including physical therapy, anti-inflammatories, and injections.  Patient was given referral for physical therapy.  Patient will take Tylenol as needed for pain control.  Patient will follow-up in 3 months for reevaluation and management.  Patient understanding and in agreement the plan.  All questions answered.  Plan: -Physical Therapy -Tylenol as needed for pain control -Follow up in 3 months for reevaluation and management

## 2025-07-22 NOTE — PHYSICAL EXAM
[de-identified] : Constitutional:  56 year old female, alert and oriented, cooperative, in no acute distress.  HEENT  NC/AT.  Appearance: symmetric  Neck/Back Straight without deformity or instability.  Good ROM.  Chest/Respiratory  Respiratory effort: no intercostal retractions or use of accessory muscles. Nonlabored Breathing  Skin  On inspection, warm and dry without rashes or lesions.  Mental Status:  Judgment, insight: intact Orientation: oriented to time, place, and person  Neurological: Sensory and Motor are grossly intact throughout  Left Knee  Inspection:     Skin intact, no rashes or lesions     No Effusion     Non-tender to palpation over tibial tubercle, patella, medial and lateral joint line, and pes insertion.  Range of Motion: 	Extension - 0 degrees 	Flexion - 120 degrees 	Extensor lag: None  Stability:      Demonstrates no Varus or Valgus instability      Negative Anterior or Posterior drawer.      Negative Lachman's  Patella: stable, tracks well.   Neurologic Exam     Motor intact including 5/5 Extensor Hallucis Longus, 5/5 Flexor Hallucis Longus, 5/5 Tibialis Anterior and 5/5 Gastrocnemius     Sensation Intact to Light Touch including Saphenous, Sural, Superficial Peroneal, Deep Peroneal, Tibial nerve distributions  Vascular Exam     Foot is warm and well perfused with 2+ Dorsalis Pedis Pulse   No pain with range of motion of the bilateral hips or right knee. No lumbar paraspinal muscle tenderness.  [de-identified] : XRay: XRays of the Left  Knee (4 Views) taken in the office today and reviewed with the patient. XRays demonstrate joint space narrowing in the medial compartment, consistent with mild osteoarthritis, KL Grade: 1. (my personal interpretation)

## 2025-07-22 NOTE — HISTORY OF PRESENT ILLNESS
[de-identified] : Tunde Avila is a 56-year-old female who presents to the office for evaluation of her left knee pain.  Patient has been experiencing left knee pain for about 2 to 3 years, worse over the last 2 to 3 months.  She has difficulty with walking.  Pain is located over the anterior knee.  She has previously tried physical therapy, without relief.  She has tried an injection.  Patient has tried Celebrex.  History: Asthma, Kidney Infection, Pre-Diabetes, Breast Cancer, Cholecystectomy

## 2025-07-22 NOTE — HISTORY OF PRESENT ILLNESS
[de-identified] : Tunde Avila is a 56-year-old female who presents to the office for evaluation of her left knee pain.  Patient has been experiencing left knee pain for about 2 to 3 years, worse over the last 2 to 3 months.  She has difficulty with walking.  Pain is located over the anterior knee.  She has previously tried physical therapy, without relief.  She has tried an injection.  Patient has tried Celebrex.  History: Asthma, Kidney Infection, Pre-Diabetes, Breast Cancer, Cholecystectomy